# Patient Record
(demographics unavailable — no encounter records)

---

## 2024-10-18 NOTE — PLAN
[Cognitive and/or Behavior Therapy] : Cognitive and/or Behavior Therapy  [Supportive Therapy] : Supportive Therapy [FreeTextEntry2] : Goal(s): Forrest will engage in healthier coping skills in order to reduce the guilt/shame associated with unhealthy coping skills; Forrest will improvement communication with his partner so that he feels heard and can support his partner as well Objective(s): Forrest will utilize talk therapy as a space to express and process his feelings; Forrest will practice use of new/different healthy coping skills and report success or barriers during session [de-identified] : NADIA and Forrest met for individual talk therapy via telehealth video visit. Forrest was at home during this visit, Forrest consented to this visit and confirmed that there was a private space to speak.  Forrest reports that he is feeling a bit down, following some unhealthy behaviors that resulted in him feeling shame and guilt.  LCSW provides a safe space for Forrest to explore and process his feelings as he provides some context to the situation.  We explore how his feelings about intimacy and his comfort level with himself plays a role in this as he reports being more conscious of the pattern of this situation.  LCSW encourages Forrest to consider how having support from his  could be helpful, as much of his shame and guilt comes from "hiding" this from his partner.  Forrest does report self-care efforts that have been helpful in the last few days and he expresses ongoing gratitude for his social supports.    Time in:  12:01pm    Time out:   12:50pm Total time spent face to face, via video: 49 min [Return in ____ week(s)] : Return in [unfilled] week(s)

## 2024-10-18 NOTE — REASON FOR VISIT
[Patient preference] : as per patient preference [Continuing, patient seen in-person within last 12 months] : Telehealth services are continuing as patient has been seen in-person within last 12 months. [Telehealth (audio & video) - Individual/Group] : This visit was provided via telehealth using real-time 2-way audio visual technology. [Medical Office: ([facility])___] : The provider was located at the medical office in [unfilled]. [Home] : The patient, [unfilled], was located at home, [unfilled], at the time of the visit. [Patient's space is appropriate for telehealth and maintains privacy/confidentiality.] : Patient's space is appropriate for telehealth and maintains privacy/confidentiality. [Participant(s) identity verified] : Participant(s) identity verified. [Verbal consent obtained from patient/other participant(s)] : Verbal consent for telehealth/telephonic services obtained from patient/other participant(s) [Patient] : Patient [FreeTextEntry4] : 12:01pm [FreeTextEntry5] : 12:50 pm [FreeTextEntry2] : Oct, 2024 [FreeTextEntry1] : routine outpatient talk therapy

## 2024-10-23 NOTE — PLAN
[FreeTextEntry2] : Goal(s): Forrest will engage in healthier coping skills in order to reduce the guilt/shame associated with unhealthy coping skills; Forrest will improvement communication with his partner so that he feels heard and can support his partner as well Objective(s): Forrest will utilize talk therapy as a space to express and process his feelings; Forrest will practice use of new/different healthy coping skills and report success or barriers during session [Cognitive and/or Behavior Therapy] : Cognitive and/or Behavior Therapy  [Supportive Therapy] : Supportive Therapy [de-identified] : LCSW and Forrest met for individual talk therapy via telehealth video visit. Forrest was at home during this visit, he consented to this visit and confirmed that there was a private space to speak.  Forrest presents as engaged, he reports doing "ok", with some reduction in negative thought processes with efforts to validate and support himself.  We review re-framing in the context of specific thought processes that he has tried to challenge, in order to reduce feelings of guilt and shame surrounding a decision he made.  He continues to try to understand some of the challenges he has with intimacy with his partner as we review some of the goals that the couple has made for themselves in prior family sessions.  While Forrest has been more guarded about this topic in the past, he is more open today and explores what he thinks could be a change for the couple to improve their intimacy and agrees to experiment and report back in future sessions.  Support and encouragement are provided during the session, next visit set for 2 weeks.    Time in: 10:03am     Time out:  10:48am Total time spent face to face, via video: 45 min [Return in ____ week(s)] : Return in [unfilled] week(s)

## 2024-10-23 NOTE — END OF VISIT
[Duration of Psychotherapy Visit (minutes spent in synchronous communication): ____] : Duration of Psychotherapy Visit (minutes spent in synchronous communication): [unfilled] [Individual Psychotherapy for 38-52 minutes] : Individual Psychotherapy for 38 - 52 minutes [Licensed Clinician] : Licensed Clinician Patient

## 2024-10-23 NOTE — REASON FOR VISIT
[Patient preference] : as per patient preference [Continuing, patient seen in-person within last 12 months] : Telehealth services are continuing as patient has been seen in-person within last 12 months. [Telehealth (audio & video) - Individual/Group] : This visit was provided via telehealth using real-time 2-way audio visual technology. [Medical Office: ([facility])___] : The provider was located at the medical office in [unfilled]. [Home] : The patient, [unfilled], was located at home, [unfilled], at the time of the visit. [Patient's space is appropriate for telehealth and maintains privacy/confidentiality.] : Patient's space is appropriate for telehealth and maintains privacy/confidentiality. [Participant(s) identity verified] : Participant(s) identity verified. [Verbal consent obtained from patient/other participant(s)] : Verbal consent for telehealth/telephonic services obtained from patient/other participant(s) [FreeTextEntry4] : 10:03am [FreeTextEntry5] : 10:48am [Patient] : Patient [FreeTextEntry1] : routine outpatient talk therapy

## 2024-11-11 NOTE — REASON FOR VISIT
[Patient preference] : as per patient preference [Continuing, patient seen in-person within last 12 months] : Telehealth services are continuing as patient has been seen in-person within last 12 months. [Telehealth (audio & video) - Individual/Group] : This visit was provided via telehealth using real-time 2-way audio visual technology. [Medical Office: ([facility])___] : The provider was located at the medical office in [unfilled]. [Home] : The patient, [unfilled], was located at home, [unfilled], at the time of the visit. [Patient's space is appropriate for telehealth and maintains privacy/confidentiality.] : Patient's space is appropriate for telehealth and maintains privacy/confidentiality. [Participant(s) identity verified] : Participant(s) identity verified. [Verbal consent obtained from patient/other participant(s)] : Verbal consent for telehealth/telephonic services obtained from patient/other participant(s) [Patient] : Patient [FreeTextEntry4] : 1:03pm [FreeTextEntry5] : 1:51pm [FreeTextEntry2] : Oct, 2024 [FreeTextEntry3] : present to meet with medical provider [FreeTextEntry1] : routine outpatient talk therapy

## 2024-11-11 NOTE — PLAN
[FreeTextEntry2] : Goal(s): Forrest will engage in healthier coping skills in order to reduce the guilt/shame associated with unhealthy coping skills; Forrest will improvement communication with his partner so that he feels heard and can support his partner as well Objective(s): Forrest will utilize talk therapy as a space to express and process his feelings; Forrest will practice use of new/different healthy coping skills and report success or barriers during session [Cognitive and/or Behavior Therapy] : Cognitive and/or Behavior Therapy  [Supportive Therapy] : Supportive Therapy [de-identified] : LCSW and Forrest met for individual talk therapy via telehealth video visit. Forrest was at home during this visit, he consented to this visit and confirmed that there was a private space to speak. Forrest is engaged in our visit, taking some time to process the outcome to of the election at the start of our session, noting his frustration and sadness for our country.  Outside of this, Forrest focuses on his relationship with his  and their struggle with intimacy, that his partner addressed in the last few days.  Forrest expresses feeling anxious about how the two can come to a more compromising space, as we acknowledge some of the differences in their sex drives and preferences for sexual experiences.  Forrest explores how his attraction for his partner has shifted over time but can identify how valuable and important the relationship is to him.  He processes some of his own insecurities and does well to explore how his parents' relationship was not always a positive/healthy model for him to observe, growing up.  We confirm plans for a family session next week; support and encouragement are offered.    Time in: 1:03pm     Time out: 1:51pm Total time spent face to face, via video: 48 min [Return in ____ week(s)] : Return in [unfilled] week(s)

## 2024-11-20 NOTE — PLAN
[Cognitive and/or Behavior Therapy] : Cognitive and/or Behavior Therapy  [Supportive Therapy] : Supportive Therapy [FreeTextEntry2] : Goal(s): Forrest will engage in healthier coping skills in order to reduce the guilt/shame associated with unhealthy coping skills; Forrest will improvement communication with his partner so that he feels heard and can support his partner as well Objective(s): Forrest will utilize talk therapy as a space to express and process his feelings; Forrest will practice use of new/different healthy coping skills and report success or barriers during session [de-identified] : LCSW and Forrest met for individual talk therapy via telehealth video visit. Forrest was at home during this visit, he consented to this visit and confirmed that there was a private space to speak.  Forrest reports that he is doing "pretty well" as he continues to manage work stressors and puts efforts into making change related to some family work we have done with his .  Forrest also processes his efforts to avoid unhealthy behaviors and feels proud of this, which we celebrate in real-time.  He identifies some positive activities that he has to look forward to and expresses appreciation for this as ways to re-charge.  We confirm future visit for 2 weeks.  Support and praise are offered.   Time in: 2:02pm     Time out: 2:37pm Total time spent face to face, via video: 35 min [Return in ____ week(s)] : Return in [unfilled] week(s)

## 2024-11-20 NOTE — REASON FOR VISIT
[Patient preference] : as per patient preference [Continuing, patient seen in-person within last 12 months] : Telehealth services are continuing as patient has been seen in-person within last 12 months. [Telehealth (audio & video) - Individual/Group] : This visit was provided via telehealth using real-time 2-way audio visual technology. [Medical Office: ([facility])___] : The provider was located at the medical office in [unfilled]. [Home] : The patient, [unfilled], was located at home, [unfilled], at the time of the visit. [Patient's space is appropriate for telehealth and maintains privacy/confidentiality.] : Patient's space is appropriate for telehealth and maintains privacy/confidentiality. [Participant(s) identity verified] : Participant(s) identity verified. [Verbal consent obtained from patient/other participant(s)] : Verbal consent for telehealth/telephonic services obtained from patient/other participant(s) [Patient] : Patient [FreeTextEntry4] : 2:02pm [FreeTextEntry5] : 2:37pm [FreeTextEntry2] : Oct, 2024 [FreeTextEntry1] : routine outpatient talk therapy

## 2024-12-04 NOTE — REASON FOR VISIT
[Patient preference] : as per patient preference [Continuing, patient seen in-person within last 12 months] : Telehealth services are continuing as patient has been seen in-person within last 12 months. [Telehealth (audio & video) - Individual/Group] : This visit was provided via telehealth using real-time 2-way audio visual technology. [Medical Office: ([facility])___] : The provider was located at the medical office in [unfilled]. [Home] : The patient, [unfilled], was located at home, [unfilled], at the time of the visit. [Patient's space is appropriate for telehealth and maintains privacy/confidentiality.] : Patient's space is appropriate for telehealth and maintains privacy/confidentiality. [Participant(s) identity verified] : Participant(s) identity verified. [Verbal consent obtained from patient/other participant(s)] : Verbal consent for telehealth/telephonic services obtained from patient/other participant(s) [FreeTextEntry4] : 2:01pm [FreeTextEntry5] : 2:43pm  [FreeTextEntry2] : Oct, 2024 [Patient] : Patient [FreeTextEntry1] : routine outpatient talk therapy

## 2024-12-04 NOTE — PLAN
[FreeTextEntry2] : Goal(s): Forrest will engage in healthier coping skills in order to reduce the guilt/shame associated with unhealthy coping skills; Forrest will improvement communication with his partner so that he feels heard and can support his partner as well Objective(s): Forrest will utilize talk therapy as a space to express and process his feelings; Forrest will practice use of new/different healthy coping skills and report success or barriers during session [Cognitive and/or Behavior Therapy] : Cognitive and/or Behavior Therapy  [Supportive Therapy] : Supportive Therapy [de-identified] : LCSW and Forrest met for individual talk therapy via telehealth video visit. Forrest was at home during this visit, he consented to this visit and confirmed that there was a private space to speak.  Forrest presents as engaged but shares that he engaged in an unhealthy behavior since our last session and uses this space to process the consequences of this decision.  Forrest continues to identify guilt and shame around this and continues to make a conscious decision to keep this from his  which perpetuates this guilt.  We explore what he might be willing to change to avoid this negative behavior and work to further identify what might be the trigger to him engaging in this behavior, which we identify as boredom. We build a list of activities that he may be willing to use in order to redirect his attention and to feel that he is doing more productive of enjoyable things.  Forrest expresses a commitment to himself to make this change which this LCSW supports.  Next visit set for 2 weeks.    Time in: 2:01pm      Time out:  2:43pm Total time spent face to face, via video:  42 min [Return in ____ week(s)] : Return in [unfilled] week(s)

## 2024-12-19 NOTE — REASON FOR VISIT
[Patient preference] : as per patient preference [Continuing, patient seen in-person within last 12 months] : Telehealth services are continuing as patient has been seen in-person within last 12 months. [Telehealth (audio & video) - Individual/Group] : This visit was provided via telehealth using real-time 2-way audio visual technology. [Medical Office: ([facility])___] : The provider was located at the medical office in [unfilled]. [Home] : The patient, [unfilled], was located at home, [unfilled], at the time of the visit. [Patient's space is appropriate for telehealth and maintains privacy/confidentiality.] : Patient's space is appropriate for telehealth and maintains privacy/confidentiality. [Participant(s) identity verified] : Participant(s) identity verified. [Verbal consent obtained from patient/other participant(s)] : Verbal consent for telehealth/telephonic services obtained from patient/other participant(s) [Patient] : Patient [FreeTextEntry4] : 2:01pm [FreeTextEntry5] : 2:23pm [FreeTextEntry2] : Oct, 2024 [FreeTextEntry1] : routine outpatient talk therapy

## 2024-12-19 NOTE — PLAN
[Cognitive and/or Behavior Therapy] : Cognitive and/or Behavior Therapy  [Supportive Therapy] : Supportive Therapy [Return in ____ week(s)] : Return in [unfilled] week(s) [FreeTextEntry2] : Goal(s): Forrest will engage in healthier coping skills in order to reduce the guilt/shame associated with unhealthy coping skills; Forrest will improvement communication with his partner so that he feels heard and can support his partner as well Objective(s): Forrest will utilize talk therapy as a space to express and process his feelings; Forrest will practice use of new/different healthy coping skills and report success or barriers during session [de-identified] : LCSW and Forrest met for individual talk therapy via telehealth video visit. Forrest was at home during this visit, he consented to this visit and confirmed that there was a private space to speak.  Forrest reports that he is doing "great", noting an overall stable two weeks, with ongoing efforts to manage stressors at work and engage in healthy behaviors.  Forrest identifies feeling more aware of triggers that he needs to watch out for in order to avoid engaging in unhealthy habits as we have identified "boredom" as this biggest trigger for him and thus he has been able to use healthy behaviors and activities to fill his time more productively.  Forrest processes receiving some financial help from his parents to purchase a new car and notes feeling somewhat uncomfortable about this but noting his parents' choice to spend some of their money as they age to celebrate their children.  Forrest notes some of what he is looking forward to with the coming holidays.  Support and praise are offered; next visit set for 3 weeks.   Time in:  2:01pm    Time out:  2:23pm Total time spent face to face, via video: 22 min

## 2024-12-19 NOTE — REASON FOR VISIT
[Patient preference] : as per patient preference [Continuing, patient seen in-person within last 12 months] : Telehealth services are continuing as patient has been seen in-person within last 12 months. [Telehealth (audio & video) - Individual/Group] : This visit was provided via telehealth using real-time 2-way audio visual technology. [Medical Office: ([facility])___] : The provider was located at the medical office in [unfilled]. [Home] : The patient, [unfilled], was located at home, [unfilled], at the time of the visit. [Patient's space is appropriate for telehealth and maintains privacy/confidentiality.] : Patient's space is appropriate for telehealth and maintains privacy/confidentiality. [Participant(s) identity verified] : Participant(s) identity verified. [Verbal consent obtained from patient/other participant(s)] : Verbal consent for telehealth/telephonic services obtained from patient/other participant(s) [Patient] : Patient [FreeTextEntry4] : 2:01pm [FreeTextEntry5] : 2:23pm [FreeTextEntry2] : Oct, 2024 [FreeTextEntry1] : routine outpatient talk therapy

## 2024-12-19 NOTE — PLAN
[Cognitive and/or Behavior Therapy] : Cognitive and/or Behavior Therapy  [Supportive Therapy] : Supportive Therapy [Return in ____ week(s)] : Return in [unfilled] week(s) [FreeTextEntry2] : Goal(s): Frorest will engage in healthier coping skills in order to reduce the guilt/shame associated with unhealthy coping skills; Forrest will improvement communication with his partner so that he feels heard and can support his partner as well Objective(s): Forrest will utilize talk therapy as a space to express and process his feelings; Forrest will practice use of new/different healthy coping skills and report success or barriers during session [de-identified] : LCSW and Forrest met for individual talk therapy via telehealth video visit. Forrest was at home during this visit, he consented to this visit and confirmed that there was a private space to speak.  Forrest reports that he is doing "great", noting an overall stable two weeks, with ongoing efforts to manage stressors at work and engage in healthy behaviors.  Forrest identifies feeling more aware of triggers that he needs to watch out for in order to avoid engaging in unhealthy habits as we have identified "boredom" as this biggest trigger for him and thus he has been able to use healthy behaviors and activities to fill his time more productively.  Forrest processes receiving some financial help from his parents to purchase a new car and notes feeling somewhat uncomfortable about this but noting his parents' choice to spend some of their money as they age to celebrate their children.  Forrest notes some of what he is looking forward to with the coming holidays.  Support and praise are offered; next visit set for 3 weeks.   Time in:  2:01pm    Time out:  2:23pm Total time spent face to face, via video: 22 min

## 2024-12-27 NOTE — HISTORY OF PRESENT ILLNESS
[FreeTextEntry1] : f/u [de-identified] : Patient here today for f/u  Neurofibromatosis:  Patient had full body MRI with mireles no metastasis, advised f/u 1 year. They suggest waiting for leg, but he is getting numbness/pain went to [facility] oct 24. [facility] advised just to monitor as well, still msk for now but may switch [facility] d/t insurance   HTN: he reports increased urinary freq when going into office. Gets [employer] coffee, drinks more coffee when in office smaller amt when at home  PrEP Adherence: Patient reports he is taking PrEP pretty regularly, missed couple doses usually over the past 3 months  DoxyPEP: He has used it a couple of times, last used 3 weeks ago  SH: working from home, [employer] insurance - enjoys it has been there for 20 years, lives at home with  estela. Was off xmas day and will be off nyd   MH: following [facility] for counseling. Denies any thoughts of hurting self or anyone else  Sexual Hx: Relationship status:  Partners (tell me about the genders and bodies of partners, any sperm producing partners): cis men Practices (types of sex, monogamous/polyamorous): open - bottom, oral, anal Protections from STIs (condoms, OCP, LARC, PrEP, etc.): PrEP, sometimes using condoms Past Hx of STIs:  7/23 Pregnancy (Desire or Hx): n/a LMP: n/a  Patient reports 5 sexual partners in the last 3 months. Last sexual encounter was 1 month ago nov 26   No signs/symptoms of acute HIV. No fever, myalgias, throat pain, meningismus.  Pt denies any SOB, cough, chest pain, palpitations, N/V/D/C, fever, or chills. No other health concerns today.

## 2024-12-27 NOTE — PLAN
[FreeTextEntry1] : HIV PrEP (Pre-exposure prophylaxis)   - Verbal consent given for HIV testing today - There are no signs or symptoms of acute HIV infection - HIV risk assessment done to determine if PrEP is indicated - STI risk assessment done and appropriate screenings sent - Evaluated and counseled on Hepatitis B and C risk factors and transmission - HIV risk behaviors discussed and counseled on risk reduction practices and consistent condom use - Condoms offered to patient. - Informed patient that she must be on PrEP at least for 21 days for vaginal sex and 7 days for anal sex for it to be effective. - Baseline labs including renal and liver function panels sent - Prescription of a 3 month supply of PrEP given - Discussed importance of daily medication adherence - Discussed possible medication side effects - A 3 month follow-up appointment given -Counseled patient on importance of taking PrEP everyday to be fully effective and that it will take a full 7 days to be effective again  Patient aware that condoms still need to be used with all sex acts as PrEP does not completely reduce risk of pati HIV.  To call immediately if develops any signs or symptoms of acute HIV infection (such as fevers, rash, sore throat or swollen glands). HIV testing will be done at least once every 3 months while on PrEP.   HTN: well controlled, continue medications as ordered. Advised patient to call with any questions or concerns. Patient verbalized understanding.   Educated pt to go to hospital with any worst headache of life/thunderclap headache, gait disturbances, chest pain, palpitations, acute neuro changes (slurred speech, muscle weakness, numbness/tingling, visual disturbances etc.) .Advised patient to call with any questions or concerns. Patient verbalized understanding.   HLD: repeat lipids fasting -- ate today defer. well controlled, continue medications as ordered. Will order routine blood work and f/u results to patient once made available. Advised patient to call with any questions or concerns. Patient verbalized understanding.   Neurofibromatosis: stable, continue care under specialist. Advised patient to call with any questions or concerns. Patient verbalized understanding.   Anxiety/Depression: stable, patient denies any thoughts of hurting self or anyone else at time of visit. Continue care under therapist. Advised patient to call with any questions or concerns. Patient verbalized understanding.   [facility] of colon cancer (uncle): will refer to gastro for appropriate colorectal screening. Patient has not had time to schedule yet, will set up. Advised patient to call with any questions or concerns. Patient verbalized understanding.

## 2025-01-08 NOTE — REASON FOR VISIT
[Patient preference] : as per patient preference [Continuing, patient seen in-person within last 12 months] : Telehealth services are continuing as patient has been seen in-person within last 12 months. [Telehealth (audio & video) - Individual/Group] : This visit was provided via telehealth using real-time 2-way audio visual technology. [Medical Office: ([facility])___] : The provider was located at the medical office in [unfilled]. [Home] : The patient, [unfilled], was located at home, [unfilled], at the time of the visit. [Patient's space is appropriate for telehealth and maintains privacy/confidentiality.] : Patient's space is appropriate for telehealth and maintains privacy/confidentiality. [Participant(s) identity verified] : Participant(s) identity verified. [Verbal consent obtained from patient/other participant(s)] : Verbal consent for telehealth/telephonic services obtained from patient/other participant(s) [FreeTextEntry4] : 1:06pm [FreeTextEntry5] : 1:46pm [FreeTextEntry2] : Dec, 2024 [Patient] : Patient [FreeTextEntry1] : routine outpatient talk therapy

## 2025-01-08 NOTE — PLAN
[FreeTextEntry2] : Goal(s): Forrest will engage in healthier coping skills in order to reduce the guilt/shame associated with unhealthy coping skills; Forrest will improvement communication with his partner so that he feels heard and can support his partner as well Objective(s): Forrest will utilize talk therapy as a space to express and process his feelings; Forrest will practice use of new/different healthy coping skills and report success or barriers during session [Cognitive and/or Behavior Therapy] : Cognitive and/or Behavior Therapy  [Supportive Therapy] : Supportive Therapy [de-identified] : LCSW and Forrest met for individual talk therapy via telehealth video visit. Forrest was at home during this visit, he consented to this visit and confirmed that there was a private space to speak.  Forrest reports that things are going "well" in relation for some goals he has set for himself regarding healthy habits.  He notes efforts to better manage his feelings of boredom and utilize productive activities to do so including getting back into an exercise routine.  Outside of this though, Forrest reports that his anxiety is triggered due to some family dynamics as he processes watching his parents age and having concern for how his mother offers support to her family members that may be detrimental to her.  We discuss ways that Forrest can re-frame or challenge these thought processes or even address his concerns with his mother.  He does admit that these may be exacerbated by his sister's feelings that she is sharing with him and so we discuss ways that he can set boundaries with his sister so as to better manage his anxiety surrounding this.  Validation and support are offered as well as praise for the positive changes that Forrest is focusing on himself for.    Time in: 1:06pm      Time out:  1:46pm Total time spent face to face, via video: 40 min [Return in ____ week(s)] : Return in [unfilled] week(s)

## 2025-01-27 NOTE — REASON FOR VISIT
[Patient preference] : as per patient preference [Continuing, patient seen in-person within last 12 months] : Telehealth services are continuing as patient has been seen in-person within last 12 months. [Telehealth (audio & video) - Individual/Group] : This visit was provided via telehealth using real-time 2-way audio visual technology. [Other Location: e.g. Home (Enter Location, City,State)___] : The provider was located at [unfilled]. [Home] : The patient, [unfilled], was located at home, [unfilled], at the time of the visit. [Patient's space is appropriate for telehealth and maintains privacy/confidentiality.] : Patient's space is appropriate for telehealth and maintains privacy/confidentiality. [Participant(s) identity verified] : Participant(s) identity verified. [Verbal consent obtained from patient/other participant(s)] : Verbal consent for telehealth/telephonic services obtained from patient/other participant(s) [FreeTextEntry4] : 12:01pm [FreeTextEntry5] : 12:47pm [FreeTextEntry2] : Dec, 2024 [Patient] : Patient [FreeTextEntry1] : routine outpatient talk therapy

## 2025-01-27 NOTE — PLAN
[FreeTextEntry2] : Goal(s): Forrest will engage in healthier coping skills in order to reduce the guilt/shame associated with unhealthy coping skills; Forrest will improvement communication with his partner so that he feels heard and can support his partner as well Objective(s): Forrest will utilize talk therapy as a space to express and process his feelings; Forrest will practice use of new/different healthy coping skills and report success or barriers during session [Cognitive and/or Behavior Therapy] : Cognitive and/or Behavior Therapy  [Supportive Therapy] : Supportive Therapy [de-identified] : AYDENW and Forrest met for individual talk therapy via telehealth video visit. Forrest was at home during this visit, he consented to this visit and confirmed that there was a private space to speak.  Forrest reports doing well overall, he notes success in improving self-care with efforts to engage in more regular exercise and success in avoid unhealthy behaviors.  Forrest can note that his mood can shift easily, though he is aware of some of these triggers including frustration about when the physical space at home is messy/disorganized or when his partner doesn't prioritize quality time for them.  We note how as a couple they prioritize different things but we note what has worked for them over the 20 years of their marriage so that we can focus on this in an upcoming family session.  Forrest notes what he hopes to share about these triggers but also notes what he hopes to do to compromise with his partner.  Support and encouragement are offered.    Time in:  12:01pm      Time out:  12:47pm Total time spent face to face, via video: 46 min [Return in ____ week(s)] : Return in [unfilled] week(s)

## 2025-01-30 NOTE — PLAN
[FreeTextEntry2] : Goal(s): Forrest will engage in healthier coping skills in order to reduce the guilt/shame associated with unhealthy coping skills; Forrest will improvement communication with his partner so that he feels heard and can support his partner as well Objective(s): Forrest will utilize talk therapy as a space to express and process his feelings; Forrest will practice use of new/different healthy coping skills and report success or barriers during session [de-identified] : LCSW met with Forrest and his  Kevon for a family therapy via telehealth video visit. Forrest and Kevon were at their home during this visit, they both consented to this visit and confirmed that there was a private space to speak.  Forrest and Kevon requested this visit as a follow-up to some of the work we have done in the past and to address some of the challenges at home that impact their relationship.  We focus on topics of shared responsibility for concrete tasks as well as ways to improve intimacy with both a romantic and sexual focus.  LCSW encourages each partner to share their concerns as well as potential solutions and compromise as we note some changes that they are both willing to put into place to use to "experiment" with new strategies.  We also note the value of both partners being sensitive to the other's needs as we identify their unique feelings, priorities and communication styles.  We also review some grounding techniques to address general anxiety about the current political climate and this LCSW validates concerns for minority groups.  Support validation are offered; we confirm upcoming visits.   Time in: 4:02pm     Time out: 4:52pm Total time spent face to face, via video: 50 min   [Return in ____ week(s)] : Return in [unfilled] week(s)

## 2025-01-30 NOTE — REASON FOR VISIT
[Patient preference] : as per patient preference [Continuing, patient seen in-person within last 12 months] : Telehealth services are continuing as patient has been seen in-person within last 12 months. [Telehealth (audio & video) - Individual/Group] : This visit was provided via telehealth using real-time 2-way audio visual technology. [Medical Office: ([facility])___] : The provider was located at the medical office in [unfilled]. [Home] : The patient, [unfilled], was located at home, [unfilled], at the time of the visit. [Patient's space is appropriate for telehealth and maintains privacy/confidentiality.] : Patient's space is appropriate for telehealth and maintains privacy/confidentiality. [Participant(s) identity verified] : Participant(s) identity verified. [Verbal consent obtained from patient/other participant(s)] : Verbal consent for telehealth/telephonic services obtained from patient/other participant(s) [Patient with collateral] : Patient with collateral  [Partner] : partner [FreeTextEntry4] : 4:02pm [FreeTextEntry5] : 4:52pm [FreeTextEntry2] : Dec, 2024 [FreeTextEntry1] : routine outpatient talk therapy

## 2025-01-30 NOTE — END OF VISIT
[Duration of Psychotherapy Visit (minutes spent in synchronous communication): ____] : Duration of Psychotherapy Visit (minutes spent in synchronous communication): [unfilled] [Family Therapy With Client Present] : Family Therapy With Client Present  [Licensed Clinician] : Licensed Clinician

## 2025-02-27 NOTE — REASON FOR VISIT
[Patient preference] : as per patient preference [Continuing, patient seen in-person within last 12 months] : Telehealth services are continuing as patient has been seen in-person within last 12 months. [Telehealth (audio & video) - Individual/Group] : This visit was provided via telehealth using real-time 2-way audio visual technology. [Medical Office: ([facility])___] : The provider was located at the medical office in [unfilled]. [Home] : The patient, [unfilled], was located at home, [unfilled], at the time of the visit. [Patient's space is appropriate for telehealth and maintains privacy/confidentiality.] : Patient's space is appropriate for telehealth and maintains privacy/confidentiality. [Participant(s) identity verified] : Participant(s) identity verified. [Verbal consent obtained from patient/other participant(s)] : Verbal consent for telehealth/telephonic services obtained from patient/other participant(s) [FreeTextEntry4] : 4:03pm [FreeTextEntry5] : 4:55pm [FreeTextEntry2] : Dec, 2024 [Patient with collateral] : Patient with collateral  [Partner] : partner [TextBox_17] : Forrest's  Kevon was present for this session  [FreeTextEntry1] : routine outpatient talk therapy

## 2025-02-27 NOTE — PLAN
[FreeTextEntry2] : Goal(s): Forrest will engage in healthier coping skills in order to reduce the guilt/shame associated with unhealthy coping skills; Forrest will improvement communication with his partner so that he feels heard and can support his partner as well Objective(s): Forrest will utilize talk therapy as a space to express and process his feelings; Forrest will practice use of new/different healthy coping skills and report success or barriers during session [Cognitive and/or Behavior Therapy] : Cognitive and/or Behavior Therapy  [Interpersonal Therapy] : Interpersonal Therapy  [Supportive Therapy] : Supportive Therapy [de-identified] : LCSW and Forrest met for talk therapy via telehealth video visit with his  Kevon present. Forrest and Kevon were at home during this visit, Forrest consented to this visit with his  and confirmed that there was a private space to speak. The session focuses on some follow-up from our last collateral session to discuss progress on some of the issues that they are working through.  We discuss both of their efforts to be more considerate of sharing household chores, efforts for them to plan more quality time together and goals for intimacy.  Kevon is able to express gratitude toward Forrest for being more open about his feelings about sex so that Kevon can better understand where he comes from, and thus Forrest is able to further share his feelings about how they can both support each other in enjoying intimate experiences.  Praise and support are offered.  LCSW shares news with both Forrest and Kevon about her pregnancy and thus future maternity leave around late-June.  LCSW shares that a covering provider will be available and thus we can assess need for coverage or if they wish to wait for this LCSW's return to the office.  Both share appropriate congratulations for this LCSW's news.    Time in: 4:03pm     Time out:  4:55pm Total time spent face to face, via video: 52 min  [Return in ____ week(s)] : Return in [unfilled] week(s)

## 2025-03-10 NOTE — PLAN
[FreeTextEntry2] : Goal(s): Forrest will engage in healthier coping skills in order to reduce the guilt/shame associated with unhealthy coping skills; Forrest will improvement communication with his partner so that he feels heard and can support his partner as well Objective(s): Forrest will utilize talk therapy as a space to express and process his feelings; Forrest will practice use of new/different healthy coping skills and report success or barriers during session [Cognitive and/or Behavior Therapy] : Cognitive and/or Behavior Therapy  [Supportive Therapy] : Supportive Therapy [de-identified] : LCSW and Forrest met for individual talk therapy via telehealth video visit. Forrest was at home during this visit, he consented to this visit and confirmed that there was a private space to speak.  Forrest reports feeling "tense" this week, noting some increase in stressors at work and some frustration about not receiving a raise despite doing well on a yearly performance review.  He expresses feeling some resentment towards his boss who relies on him to complete more complicated tasks than others on his team and thus notes he should be compensated for this.  Forrest admits that some of this stress is spilling out at home, and we are better able to connect how this stress then results in him feeling frustrated by his surroundings when things aren't tidy or organized.  He continues to focus on this related to what he wishes his partner would change but can admit that "maybe" he is being "dramatic" about it.  We explore how they both come from very different childhood experiences with how their homes were kept and how this may impact things in the present.  We again review if Forrest would consider psychotropic medication again to reduce the anxiety and irritability he reports experiencing, since he notes little improvement with behavioral changes.  We do celebrate some of the healthy behaviors that Forrest has continued to engage in, which he reports feeling proud of.  Support and encouragement are offered.    Time in: 3:02pm     Time out:  3:46pm Total time spent face to face, via video: 44 min

## 2025-03-10 NOTE — REASON FOR VISIT
[Patient preference] : as per patient preference [Continuing, patient seen in-person within last 12 months] : Telehealth services are continuing as patient has been seen in-person within last 12 months. [Telehealth (audio & video) - Individual/Group] : This visit was provided via telehealth using real-time 2-way audio visual technology. [Medical Office: ([facility] [place])___] : The provider was located at the medical office in [unfilled]. [Home] : The patient, [unfilled], was located at home, [unfilled], at the time of the visit. [Patient's space is appropriate for telehealth and maintains privacy/confidentiality.] : Patient's space is appropriate for telehealth and maintains privacy/confidentiality. [Participant(s) identity verified] : Participant(s) identity verified. [Verbal consent obtained from patient/other participant(s)] : Verbal consent for telehealth/telephonic services obtained from patient/other participant(s) [FreeTextEntry4] : 3:02pm [FreeTextEntry5] : 3:46pm [FreeTextEntry2] : Dec, 2024 [Patient] : Patient [FreeTextEntry1] : routine outpatient talk therapy

## 2025-03-26 NOTE — HISTORY OF PRESENT ILLNESS
[FreeTextEntry1] : f/u [de-identified] : Patient here today for f/u  Patient reports more anxiety with politics and work stress, having some difficulty sleeping d/t this. He reports sometimes feeling down, not looking to restart lexapro at this time. He reports anxiety has been okay as far as working and working on managing it in therapy, but he gets anxiety throughout the day - therapy has been helpful  Neurofibromatosis:  Patient had full body MRI with mireles no metastasis, advised f/u 1 year. [facility] advised just to monitor as well, still msk for now but may switch [facility] d/t insurance   HTN: he reports taking medication as directed, no issues   PrEP Adherence: Patient reports he is taking PrEP every day, missed 1-2 doses   DoxyPEP: He has used it a couple of times nothing recently   SH: working from home, [employer] insurance - enjoys it has been there for 20 years, lives at home with  estela. Just dealing with stress  MH: following [facility] for counseling. Denies any thoughts of hurting self or anyone else  Sexual Hx: Relationship status:  Partners (tell me about the genders and bodies of partners, any sperm producing partners): cis men Practices (types of sex, monogamous/polyamorous): open - bottom, oral, anal Protections from STIs (condoms, OCP, LARC, PrEP, etc.): PrEP, sometimes using condoms Past Hx of STIs:  7/23 Pregnancy (Desire or Hx): n/a LMP: n/a  Patient reports 1 (just estela) sexual partners in the last 3 months. Last sexual encounter was saturday   No signs/symptoms of acute HIV. No fever, myalgias, throat pain, meningismus.  Pt denies any SOB, cough, chest pain, palpitations, N/V/D/C, fever, or chills. No other health concerns today.

## 2025-03-26 NOTE — PLAN
[FreeTextEntry1] : HIV PrEP (Pre-exposure prophylaxis)   - Verbal consent given for HIV testing today - There are no signs or symptoms of acute HIV infection - HIV risk assessment done to determine if PrEP is indicated - STI risk assessment done and appropriate screenings sent - Evaluated and counseled on Hepatitis B and C risk factors and transmission - HIV risk behaviors discussed and counseled on risk reduction practices and consistent condom use - Condoms offered to patient. - Informed patient that she must be on PrEP at least for 21 days for vaginal sex and 7 days for anal sex for it to be effective. - Baseline labs including renal and liver function panels sent - Prescription of a 3 month supply of PrEP given - Discussed importance of daily medication adherence - Discussed possible medication side effects - A 3 month follow-up appointment given -Counseled patient on importance of taking PrEP everyday to be fully effective and that it will take a full 7 days to be effective again  Patient aware that condoms still need to be used with all sex acts as PrEP does not completely reduce risk of pati HIV.  To call immediately if develops any signs or symptoms of acute HIV infection (such as fevers, rash, sore throat or swollen glands). HIV testing will be done at least once every 3 months while on PrEP.   HTN: well controlled, continue medications as ordered. Advised patient to call with any questions or concerns. Patient verbalized understanding.   Educated pt to go to hospital with any worst headache of life/thunderclap headache, gait disturbances, chest pain, palpitations, acute neuro changes (slurred speech, muscle weakness, numbness/tingling, visual disturbances etc.) .Advised patient to call with any questions or concerns. Patient verbalized understanding.   HLD: well controlled, continue medications as ordered. Will order routine blood work and f/u results to patient once made available. Advised patient to call with any questions or concerns. Patient verbalized understanding.   Neurofibromatosis: stable, continue care under specialist. Advised patient to call with any questions or concerns. Patient verbalized understanding.   Anxiety/Depression: stable, patient denies any thoughts of hurting self or anyone else at time of visit. Continue care under therapist. Advised patient to call with any questions or concerns. Patient verbalized understanding.   [facility] of colon cancer (uncle): will refer to gastro for appropriate colorectal screening. Patient has not had time to schedule yet, will set up. Advised patient to call with any questions or concerns. Patient verbalized understanding.

## 2025-04-29 NOTE — REASON FOR VISIT
[Patient preference] : as per patient preference [Continuing, patient seen in-person within last 12 months] : Telehealth services are continuing as patient has been seen in-person within last 12 months. [Telehealth (audio & video) - Individual/Group] : This visit was provided via telehealth using real-time 2-way audio visual technology. [Medical Office: ([facility])___] : The provider was located at the medical office in [unfilled]. [Home] : The patient, [unfilled], was located at home, [unfilled], at the time of the visit. [Patient's space is appropriate for telehealth and maintains privacy/confidentiality.] : Patient's space is appropriate for telehealth and maintains privacy/confidentiality. [Participant(s) identity verified] : Participant(s) identity verified. [Verbal consent obtained from patient/other participant(s)] : Verbal consent for telehealth/telephonic services obtained from patient/other participant(s) [FreeTextEntry4] : 12:04pm [FreeTextEntry5] : 12:49pm [FreeTextEntry2] : March, 2025 [FreeTextEntry3] : seen in office for medical care [Patient] : Patient [FreeTextEntry1] : routine outpatient talk therapy

## 2025-04-29 NOTE — PLAN
[FreeTextEntry2] : Goal(s): Forrest will engage in healthier coping skills in order to reduce the guilt/shame associated with unhealthy coping skills; Forrest will improvement communication with his partner so that he feels heard and can support his partner as well Objective(s): Forrest will utilize talk therapy as a space to express and process his feelings; Forerst will practice use of new/different healthy coping skills and report success or barriers during session [Cognitive and/or Behavior Therapy] : Cognitive and/or Behavior Therapy  [Supportive Therapy] : Supportive Therapy [de-identified] : LCSW and Forrest met for individual talk therapy via telehealth video visit. Forrest was at home during this visit, he consented to this visit and confirmed that there was a private space to speak.  Forrest presents as engaged, he reports that he is doing well overall, sharing that he did feel re-charged in the two weeks post-vacation, but notes feeling some anxiety this week, though he expresses efforts to manage this through continued ability to engage in healthy behaviors.  Forrest notes that overall he and his  maintain some of their progress in terms of more effective communication and sharing of responsibilities at home, he notes some quality time they recently spent together which he appreciated.  Regarding their intimacy though, Forrest can note that it feels lacking in some ways but remains unsure of how to change this. We explore if there are aspects of their intimacy that Forrest wishes to change and note how we can go about communicating this in a future joint session and set some goals related to this.  Forrest is able to verbalize his commitment to his partner and hope that they can continue to work through things. Next visit set for 1 week.   Time in: 12:04pm     Time out:  12:49pm Total time spent face to face, via video: 45 min [Return in ____ week(s)] : Return in [unfilled] week(s)

## 2025-05-01 NOTE — REASON FOR VISIT
[Patient preference] : as per patient preference [Continuing, patient seen in-person within last 12 months] : Telehealth services are continuing as patient has been seen in-person within last 12 months. [Telehealth (audio & video) - Individual/Group] : This visit was provided via telehealth using real-time 2-way audio visual technology. [Other Location: e.g. Home (Enter Location, City,State)___] : The provider was located at [unfilled]. [Home] : The patient, [unfilled], was located at home, [unfilled], at the time of the visit. [Patient's space is appropriate for telehealth and maintains privacy/confidentiality.] : Patient's space is appropriate for telehealth and maintains privacy/confidentiality. [Spouse] : spouse [Participant(s) identity verified] : Participant(s) identity verified. [Verbal consent obtained from patient/other participant(s)] : Verbal consent for telehealth/telephonic services obtained from patient/other participant(s) [Patient with collateral] : Patient with collateral  [FreeTextEntry4] : 4:01pm [FreeTextEntry5] : 4:50pm [FreeTextEntry2] : March, 2025 [FreeTextEntry3] : seen for medical care in the office  [FreeTextEntry1] : routine outpatient talk therapy

## 2025-05-01 NOTE — PLAN
[FreeTextEntry2] : Goal(s): Forrest will engage in healthier coping skills in order to reduce the guilt/shame associated with unhealthy coping skills; Forrest will improvement communication with his partner so that he feels heard and can support his partner as well Objective(s): Forrest will utilize talk therapy as a space to express and process his feelings; Forrest will practice use of new/different healthy coping skills and report success or barriers during session [Family Based Therapy] : Family Based Therapy [Interpersonal Therapy] : Interpersonal Therapy  [Supportive Therapy] : Supportive Therapy [de-identified] : AYDENW and Forrest and his partner Kevon met for family talk therapy via telehealth video visit. Forrest and his  were at home during this visit, Forrest consented to this collateral visit and confirmed that there was a private space to speak.  Together the couple notes improvement overall in communicating and working together to address some of the previously identified issues.  We focus on some recent communication issues that Kevon noticed and was able to identify that he "adds" to some of the poor communication, so we explore ways that he can avoid that.  Forrest admits that he wishes his  would "just know" what he needs in the moment, so we explore ways that Kevon might be able to use context to understand Forrest's needs while also encouraging Forrest that it is ok to give a "disclaimer" about his mood or needs int he moment.  Intimacy again is explored as both partners wish to improve their intimacy but struggle to agree how things can effectively change, but through some further reflection, both can identify needs to focus on in coming weeks.  Support and encouragement are offered.    Time in: 4:01pm     Time out:  4:50pm Total time spent face to face, via video: 49 min

## 2025-05-01 NOTE — PLAN
[FreeTextEntry2] : Goal(s): Forrest will engage in healthier coping skills in order to reduce the guilt/shame associated with unhealthy coping skills; Forrest will improvement communication with his partner so that he feels heard and can support his partner as well Objective(s): Forrest will utilize talk therapy as a space to express and process his feelings; Forrest will practice use of new/different healthy coping skills and report success or barriers during session [Family Based Therapy] : Family Based Therapy [Interpersonal Therapy] : Interpersonal Therapy  [Supportive Therapy] : Supportive Therapy [de-identified] : AYDENW and Forrets and his partner Kevon met for family talk therapy via telehealth video visit. Forrest and his  were at home during this visit, Forrest consented to this collateral visit and confirmed that there was a private space to speak.  Together the couple notes improvement overall in communicating and working together to address some of the previously identified issues.  We focus on some recent communication issues that Kevon noticed and was able to identify that he "adds" to some of the poor communication, so we explore ways that he can avoid that.  Forrest admits that he wishes his  would "just know" what he needs in the moment, so we explore ways that Kevon might be able to use context to understand Forrest's needs while also encouraging Forrest that it is ok to give a "disclaimer" about his mood or needs int he moment.  Intimacy again is explored as both partners wish to improve their intimacy but struggle to agree how things can effectively change, but through some further reflection, both can identify needs to focus on in coming weeks.  Support and encouragement are offered.    Time in: 4:01pm     Time out:  4:50pm Total time spent face to face, via video: 49 min

## 2025-05-15 NOTE — REASON FOR VISIT
[Medical Office: ([facility])___] : The provider was located at the medical office in [unfilled]. [Other Location: e.g. Home (Enter Location, City,State)___] : The patient, [unfilled], was located at [unfilled] at the time of the visit. [Patient's space is appropriate for telehealth and maintains privacy/confidentiality.] : Patient's space is appropriate for telehealth and maintains privacy/confidentiality. [Participant(s) identity verified] : Participant(s) identity verified. [Verbal consent obtained from patient/other participant(s)] : Verbal consent for telehealth/telephonic services obtained from patient/other participant(s) [Patient preference] : as per patient preference [Continuing, patient seen in-person within last 12 months] : Telehealth services are continuing as patient has been seen in-person within last 12 months. [Telehealth (audio & video) - Individual/Group] : This visit was provided via telehealth using real-time 2-way audio visual technology. [Patient] : Patient [FreeTextEntry4] : 10:01am [FreeTextEntry5] : 10:52am [FreeTextEntry2] : March, 2025 [FreeTextEntry3] : Forrest was in the office to receive medical care with Lindy Whalen NP  [FreeTextEntry1] : routine outpatient talk therapy

## 2025-05-15 NOTE — REASON FOR VISIT
[Medical Office: ([facility])___] : The provider was located at the medical office in [unfilled]. [Other Location: e.g. Home (Enter Location, City,State)___] : The patient, [unfilled], was located at [unfilled] at the time of the visit. [Patient's space is appropriate for telehealth and maintains privacy/confidentiality.] : Patient's space is appropriate for telehealth and maintains privacy/confidentiality. [Participant(s) identity verified] : Participant(s) identity verified. [Verbal consent obtained from patient/other participant(s)] : Verbal consent for telehealth/telephonic services obtained from patient/other participant(s) [Patient preference] : as per patient preference [Continuing, patient seen in-person within last 12 months] : Telehealth services are continuing as patient has been seen in-person within last 12 months. [Telehealth (audio & video) - Individual/Group] : This visit was provided via telehealth using real-time 2-way audio visual technology. [Patient] : Patient [FreeTextEntry4] : 10:01am [FreeTextEntry5] : 10:52am [FreeTextEntry2] : March, 2025 [FreeTextEntry3] : Forrest was in the office to receive medical care with Lindy Whalen NP  [FreeTextEntry1] : routine outpatient talk therapy

## 2025-05-15 NOTE — PLAN
[Cognitive and/or Behavior Therapy] : Cognitive and/or Behavior Therapy  [Supportive Therapy] : Supportive Therapy [FreeTextEntry2] : Goal(s): Forrest will engage in healthier coping skills in order to reduce the guilt/shame associated with unhealthy coping skills; Forrest will improvement communication with his partner so that he feels heard and can support his partner as well Objective(s): Forrest will utilize talk therapy as a space to express and process his feelings; Forrest will practice use of new/different healthy coping skills and report success or barriers during session [de-identified] : NADIA and Forrest met for individual talk therapy via telehealth video visit. Forrest was at home during this visit, Forrest consented to this visit and confirmed that there was a private space to speak.  Forrest reports that he is feeling "anxious" and requested this due to this uncomfortable feeling.  Forrest admits that he is trying to understand how he feels and sometimes it can be difficult for her him to label and describe his feelings, but he works to do this during our session.  Forrest admits that he is feeling "frustrated" regarding he and his 's intimacy and for Forrest this is very much connected to other tasks at home like chores and quality time together because he and his  have such different needs/drives related to sex.  LCSW creates a safe space for Forrest to express these feelings, his fears and the things that he feels needs to change.  We explore what he is willing to do in order to compromise with his partner and set some goals related to this, exploring how he can communicate these goals.  Forrest remains anxious regarding certain sexual experiences and the use of sex-enhancing drugs, which he continues to set goals to avoid using. We agree to utilize a future family session to address some changes within the relationship dynamic.   Time in:   10:01am    Time out:  10:52am Total time spent face to face, via video: 51 min

## 2025-05-15 NOTE — PLAN
[Cognitive and/or Behavior Therapy] : Cognitive and/or Behavior Therapy  [Supportive Therapy] : Supportive Therapy [FreeTextEntry2] : Goal(s): Forrest will engage in healthier coping skills in order to reduce the guilt/shame associated with unhealthy coping skills; Forrest will improvement communication with his partner so that he feels heard and can support his partner as well Objective(s): Forrest will utilize talk therapy as a space to express and process his feelings; Forrest will practice use of new/different healthy coping skills and report success or barriers during session [de-identified] : NADIA and Forrest met for individual talk therapy via telehealth video visit. Forrest was at home during this visit, Forrest consented to this visit and confirmed that there was a private space to speak.  Forrest reports that he is feeling "anxious" and requested this due to this uncomfortable feeling.  Forrest admits that he is trying to understand how he feels and sometimes it can be difficult for her him to label and describe his feelings, but he works to do this during our session.  Forrest admits that he is feeling "frustrated" regarding he and his 's intimacy and for Forrest this is very much connected to other tasks at home like chores and quality time together because he and his  have such different needs/drives related to sex.  LCSW creates a safe space for Forrest to express these feelings, his fears and the things that he feels needs to change.  We explore what he is willing to do in order to compromise with his partner and set some goals related to this, exploring how he can communicate these goals.  Forrest remains anxious regarding certain sexual experiences and the use of sex-enhancing drugs, which he continues to set goals to avoid using. We agree to utilize a future family session to address some changes within the relationship dynamic.   Time in:   10:01am    Time out:  10:52am Total time spent face to face, via video: 51 min

## 2025-06-14 NOTE — REASON FOR VISIT
[Patient preference] : as per patient preference [Continuing, patient seen in-person within last 12 months] : Telehealth services are continuing as patient has been seen in-person within last 12 months. [Telehealth (audio & video) - Individual/Group] : This visit was provided via telehealth using real-time 2-way audio visual technology. [Medical Office: ([facility])___] : The provider was located at the medical office in [unfilled]. [Home] : The patient, [unfilled], was located at home, [unfilled], at the time of the visit. [Patient's space is appropriate for telehealth and maintains privacy/confidentiality.] : Patient's space is appropriate for telehealth and maintains privacy/confidentiality. [Participant(s) identity verified] : Participant(s) identity verified. [Verbal consent obtained from patient/other participant(s)] : Verbal consent for telehealth/telephonic services obtained from patient/other participant(s) [Patient] : Patient [FreeTextEntry4] : 11:33am [FreeTextEntry5] : 12:18pm  [FreeTextEntry1] : routine outpatient talk therapy

## 2025-06-14 NOTE — PLAN
[Cognitive and/or Behavior Therapy] : Cognitive and/or Behavior Therapy  [Supportive Therapy] : Supportive Therapy [FreeTextEntry2] : Goal(s): Forrest will engage in healthier coping skills in order to reduce the guilt/shame associated with unhealthy coping skills; Forrest will improvement communication with his partner so that he feels heard and can support his partner as well Objective(s): Forrest will utilize talk therapy as a space to express and process his feelings; Forrest will practice use of new/different healthy coping skills and report success or barriers during session [de-identified] : LCSW and Forrest met for individual talk therapy via telehealth video visit. Forrest was at home during this visit, he consented to this visit and confirmed that there was a private space to speak.  Forrest takes time to process an interaction with an old friend that resulted in him being able to get some closure.  He explores and reflects on his feelings about the interaction as well as the friendship, and then  processing his feelings in real time and noting how he and his  addressed the situation together and responded.  Forrest reports general feelings of relief with this closure, feeling like he can finally leave some of this in his past and feeling positive about moving forward.  We explore the positive and healthy activities that he has engaged in, his progress in becoming more physically fit and running over 5 miles recently, and noting how he has avoided engaging in negative behaviors.  Forrest and this LCSW set some goals for the next few months as this LCSW prepares for her maternity leave; support and encouragement are offered and we review how Forrest can reach the office if he needs support from the covering mental health providers before this LCSW returns.  Forrest offers well wishes to this LCSW.    Time in:  11:33am    Time out:  12:18pm Total time spent face to face, via video: 45 min [FreeTextEntry1] : LCSW will reach Forrest upon her return to the office after leave; Forrest is aware that he can reach the office for coverage

## 2025-06-14 NOTE — PLAN
[Cognitive and/or Behavior Therapy] : Cognitive and/or Behavior Therapy  [Supportive Therapy] : Supportive Therapy [FreeTextEntry2] : Goal(s): Forrest will engage in healthier coping skills in order to reduce the guilt/shame associated with unhealthy coping skills; Forrest will improvement communication with his partner so that he feels heard and can support his partner as well Objective(s): Forrest will utilize talk therapy as a space to express and process his feelings; Forrest will practice use of new/different healthy coping skills and report success or barriers during session [de-identified] : LCSW and Forrest met for individual talk therapy via telehealth video visit. Forrest was at home during this visit, he consented to this visit and confirmed that there was a private space to speak.  Forrest takes time to process an interaction with an old friend that resulted in him being able to get some closure.  He explores and reflects on his feelings about the interaction as well as the friendship, and then  processing his feelings in real time and noting how he and his  addressed the situation together and responded.  Forrest reports general feelings of relief with this closure, feeling like he can finally leave some of this in his past and feeling positive about moving forward.  We explore the positive and healthy activities that he has engaged in, his progress in becoming more physically fit and running over 5 miles recently, and noting how he has avoided engaging in negative behaviors.  Forrest and this LCSW set some goals for the next few months as this LCSW prepares for her maternity leave; support and encouragement are offered and we review how Forrest can reach the office if he needs support from the covering mental health providers before this LCSW returns.  Forrest offers well wishes to this LCSW.    Time in:  11:33am    Time out:  12:18pm Total time spent face to face, via video: 45 min [FreeTextEntry1] : LCSW will reach Forrest upon her return to the office after leave; Forrest is aware that he can reach the office for coverage

## 2025-06-14 NOTE — REASON FOR VISIT
[Patient preference] : as per patient preference [Continuing, patient seen in-person within last 12 months] : Telehealth services are continuing as patient has been seen in-person within last 12 months. [Telehealth (audio & video) - Individual/Group] : This visit was provided via telehealth using real-time 2-way audio visual technology. [Medical Office: ([facility])___] : The provider was located at the medical office in [unfilled]. [Home] : The patient, [unfilled], was located at home, [unfilled], at the time of the visit. [Patient's space is appropriate for telehealth and maintains privacy/confidentiality.] : Patient's space is appropriate for telehealth and maintains privacy/confidentiality. [Participant(s) identity verified] : Participant(s) identity verified. [Verbal consent obtained from patient/other participant(s)] : Verbal consent for telehealth/telephonic services obtained from patient/other participant(s) [Patient] : Patient [FreeTextEntry4] : 11:33am [FreeTextEntry5] : 12:18pm  [FreeTextEntry1] : routine outpatient talk therapy

## 2025-06-19 NOTE — ASSESSMENT
[FreeTextEntry1] : Blood work was collected in office at time of visit.   The [employer] Safe Sex Evidence Based Intervention was applied and a lost frame message was provided.

## 2025-06-19 NOTE — HISTORY OF PRESENT ILLNESS
[FreeTextEntry1] : f/u, PrEP [de-identified] : Patient here today for f/u  Patient reports new growth in leg with neurofibromas - has paresthesia thinking about surgery is scared to do so, MRI at [facility] done 2-3 weeks ago. Patient having surgery july 13/14 - face and back removal neurofibromas at [facility]  Has otherwise been doing well fasting today. Wants to check MMR titers, was vaccinated as child in [facility]   Neurofibromatosis:  Patient had full body MRI with mireles no metastasis, advised f/u 1 year. [facility] advised just to monitor as well, still [facility] for now but may switch [facility] d/t insurance   HTN/HLD: he reports taking medication as directed, no issues   PrEP Adherence: Patient reports he is taking PrEP every day, missed couple days not active currently  DoxyPEP: He has used it a couple of times nothing recently   SH: working from home, [employer] insurance - enjoys it has been there for 20 years, lives at home with  estela. Just dealing with stress  MH: [facility]. Denies any thoughts of hurting self or anyone else  Sexual Hx: Relationship status:  Partners (tell me about the genders and bodies of partners, any sperm producing partners): cis men Practices (types of sex, monogamous/polyamorous): open - bottom, oral, anal Protections from STIs (condoms, OCP, LARC, PrEP, etc.): PrEP, sometimes using condoms Past Hx of STIs:  7/23 Pregnancy (Desire or Hx): n/a LMP: n/a  Patient reports 1 (just estela) sexual partners in the last 3 months. Last sexual encounter was last week  No signs/symptoms of acute HIV. No fever, myalgias, throat pain, meningismus.  Pt denies any SOB, cough, chest pain, palpitations, N/V/D/C, fever, or chills. No other health concerns today.

## 2025-07-15 NOTE — HISTORY OF PRESENT ILLNESS
[FreeTextEntry1] : Pt is a 44 year old male with PMH overweight (BMI 25.18), HTN, HLD, Neurofibromatosis Type 1, anxiety/depression, who presents for initial visit for evaluation/management of elevated LFTs, nodular-appearing liver on Abd US. Referred by PCP. PCP: Lindy Whalen NP GI: *** Pharmacy: ***   PRIOR DATA:  -1/2024: AST/ALT 17/20, TB 0.4, ALP 64, Alb 4.6, TP 7.7. -3/2024: AST/ALT 30/42, TB 0.4, ALP 70, Alb 4.8, TP 7.7. -12/2024: AST/ALT 23/27, TB 0.4, ALP 66, Alb 4.8, TP 8.1. -3/2025: AST/ALT 30/62, TB 0.6, ALP 86, Alb 4.9, TP 8.2. A1c 5.6%. HAV IgM negative/IgG positive (HAV immune). HBcAb total negative, HBsAg negative, HBsAb quant 611.8 (HBV immune). HCV Ab negative. -6/19/25: WBC 6.26, Hgb 15.9 (MCV 91.2), Plt 235. Total cholesterol 180, HDL 53,  (high), . HIV antigen/antibody negative. -6/27/2025: AST/ALT 30/43, TB 0.4, ALP 83, Alb 5.2, TP 8.7.  . -6/30/2025: Abdomen US (for eval elevated LFTs) => nodular contour of the liver.  No biliary ductal dilatation.  Gallbladder dome wall thickening measuring up to 1.4 cm.  No evidence of vascularity within.  This is most compatible with adenomyomatosis.  Spleen within normal limits.  No ascites. IMPRESSION: Nodular contour of the liver raising a question of cirrhosis.  Further evaluation with MRI with and without contrast may be of benefit.  Nodular thickening of the gallbladder dome likely reflects adenomyomatosis.  This can be evaluated an MRI at the same time. -7/7/2025: Na 139, K 4.1, Cr 0.91. AST/ALT 30/38, TB 0.5, ALP 69, Alb 5.0, TP 8.3.  . -7/15/25: Fibroscan =>  (S0), 5.0 kPa (F0-1), IQR 18%.    Today 7/15/25:  Accompanied by his , Kevon, today. Last drink was on 6/20/25. Feels well. Denies f/c, cp, sob, cough, abd pain, n/v/d/c, hematochezia/melena, dysuria/hematuria.  Liver Hx: Age of diagnosis: Patient was told that he had elevated ALT in 3/2025; it improved in 6/2025 but was still elevated.  GGT was also elevated.  Abdomen US showed nodular liver.  As a result, patient was referred to see hepatology. After he got his lab results on 6/27/2025 (), he began to drink more water (60 ounces daily now.  Before that, and most was 32 ounces daily) and started to avoid red meat/dessert.  He also stopped taking Truvada PrEP on 7/1/2025.  Patient reports that he lost ~3 to 4 pounds since 6/17/2025.  He reports that his last alcohol intake was on 6/20/2025. Prior liver biopsy: never Prior HE: never Prior GI bleed: never Weight hx: Peak lifetime weight 161lb in 6/2025. Was skinny as a child, and as a teen. Pt was 135/b in 20s and early 30s. Late 30s, weight started to go up. He started working and his job is sedentary.    Family Hx: Denies family hx of liver disease, cirrhosis, liver cancer, autoimmune disease, IBD, CRC or other primary GI malignancy. Maternal uncle: colon cancer with mets to liver   Social Hx Tobacco: never Alcohol: once a week, 2-3 beer each time. In his 30s, 1-2x/ week and 4-5 drinks each time.  Drugs: marijuana (smoking and edible) couple times a month; (In the past: Smoked crystal meth, also used acid, mushrooms, and mona couple times).  Denies past/present intranasal/IV drug use. Work: [employer], investigation Lives with  Kevon. Born in [facility]. Came to [place] when he was 13y/o.  Ancestry is . Education level: bachelor degree   PSH: None   Last EGD: never Last COL: never   Allergies: NKDA   CURRENT MEDS: Truvada (PrEP) (stopped 2 weeks ago. Started ~ 8/2020)  Doxycycline 200 mg as needed (DoxyPEP) (Last 11/2024) Losartan-HCTZ 100-12.5 mg daily (since 2021) Rosuvastatin 10 mg daily (since 1/2024) Sildenafil 25 mg daily as needed (haven't taken it for over a year. Only took it 3 times) [facility] Denies herbal supplements

## 2025-07-15 NOTE — ASSESSMENT
[FreeTextEntry1] : Pt is a 44 year old male with PMH overweight (BMI 25.18), HTN, HLD, Neurofibromatosis Type 1, anxiety/depression, who presents for initial visit for evaluation/management of elevated LFTs, nodular-appearing liver on Abd US. Referred by PCP.  #Elevated transaminases, GGT: #Nodular liver on abdomen US: - Of unclear etiology.  Patient denies family history of liver disease/autoimmune disease. - Patient appears to have had mild intermittent elevations in his ALT going back to at least early 2024. - Abd US (6/2025) => IMPRESSION: Nodular contour of the liver raising a question of cirrhosis. Further evaluation with MRI with and without contrast may be of benefit. Nodular thickening of the gallbladder dome likely reflects adenomyomatosis. This can be evaluated an MRI at the same time. - Fibroscan (7/15/2025) =>  (S0), 5.0 kPa (F0-1), IQR 18%.  The FibroScan results were discussed in detail with the patient today.  Plan: - Check LFTs, GGT, INR.  Check chronic liver disease serologies, PETH, CPK. - Given the discrepancy between the abdomen US (which showed nodular/cirrhotic appearing liver) and the FibroScan which showed no significant fibrosis, will obtain MR abdomen w/wo contrast for further evaluation. - Counseled pt to avoid hepatotoxins, alcohol use, herbal supplements. Limit acetaminophen to 2g/day. - HAV/HBV immune.  HCV antibody negative in 3/2025.  Of note, pt reports he is going to [facility] 8/1525 - 8/25/25.  Labs today. Patient obtain MRI abdomen prior to next appointment. RTC in 6 weeks.

## 2025-07-15 NOTE — PHYSICAL EXAM
[General Appearance - Alert] : alert [General Appearance - In No Acute Distress] : in no acute distress [General Appearance - Well Nourished] : well nourished [General Appearance - Well Developed] : well developed [General Appearance - Well-Appearing] : healthy appearing [Sclera] : the sclera and conjunctiva were normal [] : no respiratory distress [Exaggerated Use Of Accessory Muscles For Inspiration] : no accessory muscle use [Auscultation Breath Sounds / Voice Sounds] : lungs were clear to auscultation bilaterally [Heart Rate And Rhythm] : heart rate was normal and rhythm regular [Bowel Sounds] : normal bowel sounds [Abdomen Soft] : soft [Abdomen Tenderness] : non-tender [Abnormal Walk] : normal gait [Skin Color & Pigmentation] : normal skin color and pigmentation [No Focal Deficits] : no focal deficits [Oriented To Time, Place, And Person] : oriented to person, place, and time [Impaired Insight] : insight and judgment were intact [Affect] : the affect was normal [Scleral Icterus] : No Scleral Icterus [Abdominal  Ascites] : no ascites [Asterixis] : no asterixis observed [Jaundice] : No jaundice [Depression] : no depression [Hallucinations] : ~T no ~M hallucinations [FreeTextEntry1] : Nondistended, mild abdominal adiposity

## 2025-07-24 NOTE — HISTORY OF PRESENT ILLNESS
[FreeTextEntry1] : CC: + MILAN  PMD Lindy Morenotyson, NP   HPI: 44  y old M with PMH of HTN, HLD, Neurofibromatosis Type 1 , Anxiety/Depression, with recently Dx Increased Liver function AST and ALT that improved and increased GGT, with ABD US nodular appearing liver -- states that time restarted exercise and was not drinking water well, also stopped PrEP Truvada 7/1/25   with workup low Smooth muscle ab 1:20 and + MILAN  1:160 homogeneous  HAV IgM negative/IgG positive (HAV immune). HBcAb total negative, HBsAg negative, HBsAb quant 611.8 (HBV immune). HCV Ab negative. 6/30/2025: Abdomen US Nodular contour of the liver raising a question of cirrhosis.  Nodular thickening of the gallbladder dome likely reflects adenomyomatosis.   denies photosensitive rash, no oral or genital ulcers, no history of uveitis or scleritis, no sicca symptoms, no Raynaud, no history of psoriasis, no sob or chest pain no cough no recent or recurrent infections no diarrhea no blood in urine or stool rest of the review system negative   CURRENT MEDS: Truvada (PrEP) (stopped 2 weeks ago. Started ~ 8/2020) Doxycycline 200 mg as needed (DoxyPEP) (Last 11/2024) Losartan-HCTZ 100-12.5 mg daily (since 2021) Rosuvastatin 10 mg daily (since 1/2024) Sildenafil 25 mg daily as needed (haven't taken it for over a year. Only took it 3 times) No supplements   ALL NKDA SH:  Alcohol socially  once a week, 2-3 beer each time. In his 30s, 1-2x/ week and 4-5 drinks each time. Drugs: marijuana (smoking and edible) couple times a month; (In the past: Smoked crystal meth, also used acid, mushrooms, and mona couple times). Denies past/present intranasal/IV drug use. Lives with  Kevon. Born in [facility]. Came to [place] when he was 13y/o. Ancestry is . FH no family history of autoimmune disease

## 2025-07-24 NOTE — PHYSICAL EXAM
[General Appearance - In No Acute Distress] : in no acute distress [PERRL With Normal Accommodation] : pupils were equal in size, round, and reactive to light [Examination Of The Oral Cavity] : the lips and gums were normal [Oropharynx] : the oropharynx was normal [] : the neck was supple [Respiration, Rhythm And Depth] : normal respiratory rhythm and effort [Auscultation Breath Sounds / Voice Sounds] : lungs were clear to auscultation bilaterally [Chest Palpation] : palpation of the chest revealed no abnormalities [Heart Rate And Rhythm] : heart rate was normal and rhythm regular [Heart Sounds] : normal S1 and S2 [Murmurs] : no murmurs [Edema] : there was no peripheral edema [Bowel Sounds] : normal bowel sounds [Abdomen Soft] : soft [Abdomen Tenderness] : non-tender [No Spinal Tenderness] : no spinal tenderness [Musculoskeletal - Swelling] : no joint swelling seen [Motor Tone] : muscle strength and tone were normal [FreeTextEntry1] : few raised nodular lesions fore arm, most facial lesions removed ( Neurofibromatosis type I)  [Motor Exam] : the motor exam was normal [Mood] : the mood was normal